# Patient Record
(demographics unavailable — no encounter records)

---

## 2025-02-13 NOTE — HISTORY OF PRESENT ILLNESS
[Lower back] : lower back [6] : 6 [2] : 2 [Dull/Aching] : dull/aching [Tightness] : tightness [Constant] : constant [Household chores] : household chores [Leisure] : leisure [Work] : work [Rest] : rest [Standing] : standing [Walking] : walking [Full time] : Work status: full time [de-identified] : Body part: lower back Better/ Worse/ Same since last visit: worse Treatments since last visit: Had Facet Joint CSI Bilateral L5-S1 on 6/19/24 with relief for ~3 months Difficulty with: prolonged sitting Radicular symptoms: into the tailbone Current medications for pain: Ibuprofen PRN Assistive walking device: none  Today's Pain: 6-7/10  [] : no [FreeTextEntry5] : chronic - at least +5 years.  [FreeTextEntry6] : sore  [FreeTextEntry7] : sometimes runs down leg.  [de-identified] :

## 2025-02-13 NOTE — ASSESSMENT
[FreeTextEntry1] : MRI of L-Spine was reviewed today and is as follows:  Small right foraminal HNP L5-S1 Right facet arthropathy L5-S1  46 yo female presents today for follow up on her low back pain. Patient had 3 months of relief following bilateral L5-S1 facet joint CSI in 6/19. Patient with continued pain as result of facet arthropathy and spondylolisthesis at L5-S1. We discussed conservative modalities for relief.   - Proceed with bilateral L5-S1 facet joint CSI   - Recommend physical therapy to regain range of motion, strengthening and symptomatic improvement. Prescription given in office today.    - Patient will continue HEP as detailed in office. Emphasized daily stretching and strengthening.   - Recommend NSAIDs PRN - Recommend heating pad use to decrease muscle spasm - Discussed the importance of home exercises, including but not limited to hamstring stretching and core strengthening   Patient was educated on their diagnosis today. All questions answered and patient expressed understanding.  Follow up in 2 weeks after injection

## 2025-04-24 NOTE — ASSESSMENT
[FreeTextEntry1] : MRI of L-Spine was reviewed today and is as follows:  Small right foraminal HNP L5-S1 Right facet arthropathy L5-S1  48 yo female presents today for follow up on her low back pain, S/P Facet Joint CSI Bilateral L5-S1 on 3/19/25 with 80% relief in symptoms. She has undergone PT since her injection with continued relief. However, given persistent symptoms requiring injection, recommend proceeding with new MRI for further evaluation of progression of stenosis.    - Patient given prescription for MRI, follow up after study is completed to discuss results.  Patient has been doing a home exercise plan based on exercises given on their last office visit.  These exercises include calf stretching, hamstring stretching, hip flexor stretching, hip rotator stretch, quad stretching, curl ups, bridges, prone press up, knee lift leg reach and wall slide.  Patient has been doing these exercises for over 6 weeks, roughly 4 times a week for 30 minutes daily.  Patient is still experiencing low back pain with radiculopathy.   - Recommend physical therapy to regain range of motion, strengthening and symptomatic improvement. Prescription given in office today.    - Patient will continue HEP as detailed in office. Emphasized daily stretching and strengthening.   - Recommend NSAIDs PRN - Recommend heating pad use to decrease muscle spasm - Discussed the importance of home exercises, including but not limited to hamstring stretching and core strengthening   Patient was educated on their diagnosis today. All questions answered and patient expressed understanding.  Follow up after MRI

## 2025-04-24 NOTE — HISTORY OF PRESENT ILLNESS
[de-identified] : Body part: lower back Better/ Worse/ Same since last visit: better Treatments since last visit: S/P Facet Joint CSI Bilateral L5-S1 on 3/19/25 with relief  Difficulty with: prolonged sitting Radicular symptoms: into the tailbone with prolonged sitting Current medications for pain: Ibuprofen PRN Assistive walking device: none  Today's Pain: 1-2/10

## 2025-04-24 NOTE — REASON FOR VISIT
[FreeTextEntry2] : lumbar spine pain -  Facet Joint CSI Bilateral L5-S1 on 6/19/24  Facet Joint CSI Bilateral L5-S1 on 3/19/25

## 2025-06-28 NOTE — HISTORY OF PRESENT ILLNESS
[de-identified] : Body part: lower back Better/ Worse/ Same since last visit: same Treatments since last visit: MRI at [place], Denies returning to PT due to schedule Difficulty with: prolonged sitting Radicular symptoms: into the tailbone with prolonged sitting Current medications for pain: Ibuprofen PRN Assistive walking device: none  Today's Pain: 3-4/10

## 2025-06-28 NOTE — DISCUSSION/SUMMARY
[de-identified] : I discussed non operative and operative treatment options in great detail with the patient. I discussed treatment options, including but not limited to, 1. Non operative treatment - rest, NSAID, physical therapy etc. 2. Interventional treatment - injections etc. 3. Surgical treatment - Bilateral L5-S1 endoscopic rhizotomy   - Patient with persistent symptoms refractory to non-operative care. Patient is seeking surgical options. Patient is a candidate for surgery after failing extensive non operative care.  Patient wants to proceed with surgical intervention after failing nonoperative care. I had a lengthy discussion with the patient about the rational and goal of the surgery as well as expected outcome. I encouraged patient to seek a second opinion regarding surgery. I explained postoperative rehabilitation and recovery process to the patient. I discussed risks, benefits and alternatives of the procedure in detail with the patient. I counseled patient about risks and possible complications, including but not limited to, infection, bleeding, nerve injury, arterial and venous injury, single or multiple muscle group weakness, dural tear, CSF leak, pseudomeningocele, arachnoiditis, CSF fistula, meningitis, discitis, osteomyelitis, epidural hematoma, DVT, PE, CRPS, MI, paralysis, persistent symptoms, and risks of anesthesia. I explained to the patient that the surgical outcome is unpredictable and there is no guarantee that the symptoms will resolve after the surgery. The patient understands and wishes to proceed. All questions were answered and patient was given information to review.

## 2025-06-28 NOTE — DISCUSSION/SUMMARY
[de-identified] : I discussed non operative and operative treatment options in great detail with the patient. I discussed treatment options, including but not limited to, 1. Non operative treatment - rest, NSAID, physical therapy etc. 2. Interventional treatment - injections etc. 3. Surgical treatment - Bilateral L5-S1 endoscopic rhizotomy   - Patient with persistent symptoms refractory to non-operative care. Patient is seeking surgical options. Patient is a candidate for surgery after failing extensive non operative care.  Patient wants to proceed with surgical intervention after failing nonoperative care. I had a lengthy discussion with the patient about the rational and goal of the surgery as well as expected outcome. I encouraged patient to seek a second opinion regarding surgery. I explained postoperative rehabilitation and recovery process to the patient. I discussed risks, benefits and alternatives of the procedure in detail with the patient. I counseled patient about risks and possible complications, including but not limited to, infection, bleeding, nerve injury, arterial and venous injury, single or multiple muscle group weakness, dural tear, CSF leak, pseudomeningocele, arachnoiditis, CSF fistula, meningitis, discitis, osteomyelitis, epidural hematoma, DVT, PE, CRPS, MI, paralysis, persistent symptoms, and risks of anesthesia. I explained to the patient that the surgical outcome is unpredictable and there is no guarantee that the symptoms will resolve after the surgery. The patient understands and wishes to proceed. All questions were answered and patient was given information to review.

## 2025-06-28 NOTE — ASSESSMENT
[FreeTextEntry1] :   MRI of L-Spine was reviewed today and is as follows:  Small right foraminal HNP L5-S1 Right facet arthropathy L5-S1  5/2025 MRI of L-Spine was reviewed today and is as follows:  DDD L5-S1 without significant loss of disc height Severe right and moderate left L5-S1 facet arthropathy   48 yo female presents today for follow up on her low back pain S/P Facet Joint CSI Bilateral L5-S1 on 3/19/25 with 80% relief in symptoms. However, as with previous set of injections in 6/2024, pain returned after 2-3 months. We discussed that given persistent pain despite multiple injections and PT, endoscopic rhizotomy is indicated.   - Patient with persistent symptoms refractory to non-operative care. Patient is seeking surgical options. Patient is a candidate for surgery after failing extensive non operative care.  - Recommend NSAIDs PRN - Recommend heating pad use to decrease muscle spasm - Discussed the importance of home exercises, including but not limited to hamstring stretching and core strengthening   Patient was educated on their diagnosis today. All questions answered and patient expressed understanding.  Follow up PRN then 2 weeks after surgery

## 2025-06-28 NOTE — HISTORY OF PRESENT ILLNESS
[de-identified] : Body part: lower back Better/ Worse/ Same since last visit: same Treatments since last visit: MRI at [place], Denies returning to PT due to schedule Difficulty with: prolonged sitting Radicular symptoms: into the tailbone with prolonged sitting Current medications for pain: Ibuprofen PRN Assistive walking device: none  Today's Pain: 3-4/10

## 2025-06-28 NOTE — DATA REVIEWED
[MRI] : MRI [Lumbar Spine] : lumbar spine [I independently reviewed and interpreted images and report] : I independently reviewed and interpreted images and report [FreeTextEntry1] : 5/2025 MRI of L-Spine was reviewed today and is as follows:  DDD L5-S1 without significant loss of disc height Severe right and moderate left L5-S1 facet arthropathy